# Patient Record
Sex: FEMALE | Race: WHITE | Employment: FULL TIME | URBAN - METROPOLITAN AREA
[De-identification: names, ages, dates, MRNs, and addresses within clinical notes are randomized per-mention and may not be internally consistent; named-entity substitution may affect disease eponyms.]

---

## 2017-09-21 PROBLEM — Z83.79 FH: CELIAC DISEASE: Status: ACTIVE | Noted: 2017-09-21

## 2017-09-25 PROBLEM — K90.0 CELIAC DISEASE: Status: ACTIVE | Noted: 2017-09-25

## 2017-10-10 ENCOUNTER — HOSPITAL ENCOUNTER (OUTPATIENT)
Dept: LAB | Age: 32
Discharge: HOME | End: 2017-10-10

## 2017-10-10 PROCEDURE — 88305 TISSUE EXAM BY PATHOLOGIST: CPT | Performed by: INTERNAL MEDICINE

## 2018-04-24 PROBLEM — E55.9 VITAMIN D DEFICIENCY: Status: ACTIVE | Noted: 2018-04-24

## 2018-04-24 PROBLEM — E53.8 B12 DEFICIENCY: Status: ACTIVE | Noted: 2018-04-24

## 2018-05-22 ENCOUNTER — HOSPITAL ENCOUNTER (OUTPATIENT)
Dept: MAMMOGRAPHY | Age: 33
Discharge: HOME | End: 2018-05-22
Attending: FAMILY MEDICINE
Payer: COMMERCIAL

## 2018-05-22 DIAGNOSIS — Z80.3 FAMILY HISTORY OF BREAST CANCER IN FIRST DEGREE RELATIVE: ICD-10-CM

## 2018-05-22 PROCEDURE — 77067 SCR MAMMO BI INCL CAD: CPT

## 2018-09-17 ENCOUNTER — HOSPITAL ENCOUNTER (OUTPATIENT)
Dept: NUTRITION | Age: 33
Discharge: HOME | End: 2018-09-17
Payer: SELF-PAY

## 2018-09-17 PROCEDURE — 97802 MEDICAL NUTRITION INDIV IN: CPT

## 2018-09-20 NOTE — PROGRESS NOTES
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 .  Left food recall blank but describes her diet as \"eating out a lot. \" Is presently following a gluten free diet and has a good eduction for guidelines. Has attended a Celiac Disease Foundation symposium and also has attended pediatric gluten free classes for her son. For dining out pt reports looking at menus ahead of time for selections and also questioning carefully the establishment staff, cooks as needed to ensure that cross contamination is prevented. Brings what she terms \"rescue food\" in the event that the restaurant does not seem to be able to prevent cross contamination. Pt states that she has ceased to drink alcohol in order to avoid gluten (did enjoy beer). States she uses [employer], but does note occassions where she may a drink with employees while dining as a social connection. · Pt recalls that she was a \"silent Celiac\" until her son was hospitalized and then diagnosed with Celiac- following that family members were advised to have testing done. States her original biopsy was performed in October 2017. Reports 6 months later after following a gluten free diet a f/u assessment indicated that her gut mucosa was still not normal.  Of note, pt began to discuss history of having eating disorder counseling in college. Reports high stress can lead to emotional eating. Also notes that she has a history of restrcitng foods for weight and thought at first \"this is no big deal, but now realizing this is for life. \"      · Pt is very active with \"boot camp\" classes @ 4-5 days/week and rides horses as well. States she uses [employer] with a given 1200 kcal level. Reports 6 hours of sleep/night and would like to get 8hr/night.     · Pt desires further education regarding doubling checking if she is missing any consideration for eliminating gluten from her diet. Current Diagnosis:  Celiac disease     Past Medical History (all per pt):   ED counseling reported in college years  Anorexia  Binge eating disorder  anxiety    Current Medications:   VSL #3    Supplements/Vitamins/Herbs:  Vitamin D  Vitamin B12  E[place]    Socioeconomic Status/Current Lifestyle:   Pt is , with 2 children, and shares a business with her . Anthropometrics:  Ht: 5'4(1.626m)  Wt: 149# (67.7 kg)  IBW: 120# +/- 10%   BMI: 25.6 (Overweight BMI Class)   Recent changes in wt: Pt reports small amount of weight loss following gluten free changes, but reports wt fluctuates. Wt history in EMR:  WT / BMI WEIGHT BODY MASS INDEX   8/16/2018 152 lb 25.69 kg/m2   4/6/2018 148 lb 9.6 oz 25.11 kg/m2   2/12/2018 148 lb 12.8 oz 25.15 kg/m2   12/13/2017 150 lb 25.35 kg/m2   9/21/2017 157 lb 26.53 kg/m2   9/22/2016 148 lb 6.4 oz 25.08 kg/m2     Macronutrient needs:  EER: 1907 kcal/day (MSJ x PAL factor 1.4 to maintain current wt) +/- 10%  EPR: 60-72 gm pro/day (based on 1-1.2 gm/kg IBW 0f 60kg)    Est CHO Requirement:  238 gm/day (50% of kcals)  Est Fluid/day- 1.9 L liters/day (1 ml/kcal)                                                                            Nutrition Diagnosis: Food and nutrition knowledge deficit r/t lack of exposure to nutrition related information as evidenced new diagnosis of celiac disease and pt's request to confirm information regarding gluten-free diet guidelines. Intervention:      1. Nutrition Education:   · Provided celiac diet plan and discussed appropriate food choices and food choices to avoid. Recommended increasing kcal level considering pt's activity level. Recommended focuins on portions of food chocies verses counting kcals, eating on a regular schedule (no skipped meals) and having nutrition to support physical activity.   · Educated pt on hidden sources of gluten in medications, MVIs, and processed foods such as salad dressings and marinades, snack foods, beverages, and cross-contaminated oats. · Made recommendations for dining out, such as researching menus beforehand for gluten-free options, asking  about gluten-free options (and informing them pt is gluten-free), and using gluten-free apps to look up restaurants that offer gluten-free options. Encouraged to to question staff regarding separate preparation and severing equipment, separate prep area, and necessity to wash hands, change gloves, etc.   · Provided handout out gluten-free lifestyle apps. · Pt was attentive throughout session and asked appropriate questions. Pt is highly motivated to adhere to celiac diet. Expect good attempts will be made to follow diet recommendations. Unsure if barriers may exist with any unresolved emotional eating/ ED/ binge eating history. Gave resources for practitioners to assist with any residual signs of E.D behaviors. Encouraged pt to call with questions or if I can assist with referral.    Materials Provided:   1. Celiac Diet Plan  2. Tips for Avoiding Cross Contamination with Celiac Disease  3. Celiac Disease Foundation Tips for Carlos Morton  4. Celiac Disease Foundation Gluten in Medications, Vitamins, and Supplements  5. Celiac Disease Foundation Gluten Free Resources  6. Today's Dietitian Apps for Gluten Free Eating  7. Tax Deductions for Celiac Disease  8. Intuitive Eating Principles  9. Tips for Normal Eating  10. Information: Tracy Sexton MA, RD, LPC, LD   11. Email: Sports Nutrition and Sleep handouts    Monitoring/Evaluation Details: Follow-up Appointment: n/a    Follow-up Monitoring Plans: Will maggy to check on progress and any f/u needs in 3-4 weeks.      Mara Llanos MS, [address], [address] [place] [address] Rd, [place]  W: [phone]  C: [phone]

## 2019-06-10 ENCOUNTER — DOCUMENTATION ONLY (OUTPATIENT)
Dept: NUTRITION | Age: 34
End: 2019-06-10

## 2019-06-10 NOTE — PROGRESS NOTES
Nutrition Counseling:  Pt referred by Dr. Angel Reyes. Pt has not called with questions or to schedule a  f/u appt. Will close referral on this end.     Abhishek Muro MS, [address] [place] [address], [id number], [place]  W: [phone]  C: [phone]

## 2021-02-17 ENCOUNTER — HOSPITAL ENCOUNTER (OUTPATIENT)
Dept: LAB | Age: 36
Discharge: HOME | End: 2021-02-17

## 2021-02-17 PROCEDURE — 88305 TISSUE EXAM BY PATHOLOGIST: CPT

## 2021-02-17 PROCEDURE — 88312 SPECIAL STAINS GROUP 1: CPT

## 2021-02-22 ENCOUNTER — HOSPITAL ENCOUNTER (OUTPATIENT)
Dept: INFUSION THERAPY | Age: 36
Discharge: HOME | End: 2021-02-22
Payer: COMMERCIAL

## 2021-02-22 VITALS
TEMPERATURE: 96.9 F | DIASTOLIC BLOOD PRESSURE: 65 MMHG | SYSTOLIC BLOOD PRESSURE: 111 MMHG | HEART RATE: 75 BPM | RESPIRATION RATE: 18 BRPM | OXYGEN SATURATION: 100 %

## 2021-02-22 PROCEDURE — 74011250636 HC RX REV CODE- 250/636: Performed by: INTERNAL MEDICINE

## 2021-02-22 PROCEDURE — 96361 HYDRATE IV INFUSION ADD-ON: CPT

## 2021-02-22 PROCEDURE — 96365 THER/PROPH/DIAG IV INF INIT: CPT

## 2021-02-22 RX ORDER — EPINEPHRINE 1 MG/ML
0.3 INJECTION, SOLUTION, CONCENTRATE INTRAVENOUS AS NEEDED
Status: ACTIVE | OUTPATIENT
Start: 2021-02-22 | End: 2021-02-22

## 2021-02-22 RX ORDER — ACETAMINOPHEN 325 MG/1
650 TABLET ORAL AS NEEDED
Status: ACTIVE | OUTPATIENT
Start: 2021-02-22 | End: 2021-02-22

## 2021-02-22 RX ORDER — HYDROCORTISONE SODIUM SUCCINATE 100 MG/2ML
100 INJECTION, POWDER, FOR SOLUTION INTRAMUSCULAR; INTRAVENOUS AS NEEDED
Status: ACTIVE | OUTPATIENT
Start: 2021-02-22 | End: 2021-02-22

## 2021-02-22 RX ORDER — DIPHENHYDRAMINE HYDROCHLORIDE 50 MG/ML
50 INJECTION, SOLUTION INTRAMUSCULAR; INTRAVENOUS AS NEEDED
Status: DISPENSED | OUTPATIENT
Start: 2021-02-22 | End: 2021-02-22

## 2021-02-22 RX ORDER — ALBUTEROL SULFATE 0.83 MG/ML
2.5 SOLUTION RESPIRATORY (INHALATION) AS NEEDED
Status: ACTIVE | OUTPATIENT
Start: 2021-02-22 | End: 2021-02-22

## 2021-02-22 RX ORDER — ONDANSETRON 2 MG/ML
8 INJECTION INTRAMUSCULAR; INTRAVENOUS AS NEEDED
Status: ACTIVE | OUTPATIENT
Start: 2021-02-22 | End: 2021-02-22

## 2021-02-22 RX ORDER — MELATONIN
DAILY
COMMUNITY

## 2021-02-22 RX ADMIN — FERRIC CARBOXYMALTOSE INJECTION 750 MG: 50 INJECTION, SOLUTION INTRAVENOUS at 09:21

## 2021-02-22 RX ADMIN — SODIUM CHLORIDE 500 ML: 900 INJECTION, SOLUTION INTRAVENOUS at 09:00

## 2021-02-22 NOTE — PROGRESS NOTES
I spoke with Mrs. Temple regarding the [place] [facility] co-pay program.  I went over both programs with her, the enrollment process, and what the co-pay program helps with. She agreed to allow me to do the enrollment for her and signed the LPOA document. I went over the hospital financial assistance application with Mrs. Temple as well. She inidcated that she is over the income limit for the program.    Patient expressed understanding of the above information and all questions were answered to her satisfaction. LPOA will be scanned into chart.

## 2021-02-22 NOTE — PROGRESS NOTES
Tolerated Injectafer infusion over 20 minutes. Observed x  30 minutes without reaction. Directed to push oral fluids for next 24-48 hours. Reviewed side effects of medication such as constipation and black stools. Verbalizes understanding. Teaching handout with patient. Patient discharged via ambulation accompanied by self. Instructed to notify physician of any problems, questions or concerns after discharge. Next appointment is 03/01/2021 at 0830 with Infusion.

## 2021-03-01 ENCOUNTER — HOSPITAL ENCOUNTER (OUTPATIENT)
Dept: INFUSION THERAPY | Age: 36
Discharge: HOME | End: 2021-03-01
Payer: COMMERCIAL

## 2021-03-01 VITALS
TEMPERATURE: 97.1 F | OXYGEN SATURATION: 99 % | HEART RATE: 55 BPM | RESPIRATION RATE: 18 BRPM | SYSTOLIC BLOOD PRESSURE: 105 MMHG | DIASTOLIC BLOOD PRESSURE: 68 MMHG

## 2021-03-01 PROCEDURE — 74011250636 HC RX REV CODE- 250/636: Performed by: INTERNAL MEDICINE

## 2021-03-01 PROCEDURE — 96365 THER/PROPH/DIAG IV INF INIT: CPT

## 2021-03-01 PROCEDURE — 74011250636 HC RX REV CODE- 250/636: Performed by: FAMILY MEDICINE

## 2021-03-01 RX ORDER — SODIUM CHLORIDE 0.9 % (FLUSH) 0.9 %
5-10 SYRINGE (ML) INJECTION EVERY 8 HOURS
Status: DISCONTINUED | OUTPATIENT
Start: 2021-03-01 | End: 2021-03-03 | Stop reason: HOSPADM

## 2021-03-01 RX ADMIN — Medication 10 ML: at 09:00

## 2021-03-01 RX ADMIN — SODIUM CHLORIDE 500 ML: 900 INJECTION, SOLUTION INTRAVENOUS at 09:00

## 2021-03-01 RX ADMIN — FERRIC CARBOXYMALTOSE INJECTION 750 MG: 50 INJECTION, SOLUTION INTRAVENOUS at 09:15

## 2021-03-01 NOTE — PROGRESS NOTES
Pt. Discharged ambulatory. Tolerated infusion well. No distress noted. To call physician with any problems or concerns. Understanding voiced. To follow up with ordering physician. No future appointments needed.

## 2022-03-18 PROBLEM — K90.0 CELIAC DISEASE: Status: ACTIVE | Noted: 2017-09-25

## 2022-03-18 PROBLEM — E53.8 B12 DEFICIENCY: Status: ACTIVE | Noted: 2018-04-24

## 2022-03-18 PROBLEM — Z83.79 FH: CELIAC DISEASE: Status: ACTIVE | Noted: 2017-09-21

## 2022-03-19 PROBLEM — E55.9 VITAMIN D DEFICIENCY: Status: ACTIVE | Noted: 2018-04-24

## 2023-05-10 RX ORDER — CETIRIZINE HYDROCHLORIDE 10 MG/1
TABLET ORAL
COMMUNITY

## 2023-09-05 ENCOUNTER — TRANSCRIBE ORDERS (OUTPATIENT)
Dept: SCHEDULING | Age: 38
End: 2023-09-05

## 2023-09-05 DIAGNOSIS — R10.32 ABDOMINAL PAIN, LLQ: ICD-10-CM

## 2023-09-05 DIAGNOSIS — K90.0 CELIAC DISEASE: ICD-10-CM

## 2023-09-05 DIAGNOSIS — N92.0 MENORRHAGIA WITH REGULAR CYCLE: ICD-10-CM

## 2023-09-12 ENCOUNTER — HOSPITAL ENCOUNTER (OUTPATIENT)
Dept: CT IMAGING | Age: 38
Discharge: HOME | End: 2023-09-15
Attending: INTERNAL MEDICINE
Payer: COMMERCIAL

## 2023-09-12 DIAGNOSIS — R10.32 ABDOMINAL PAIN, LLQ: ICD-10-CM

## 2023-09-12 DIAGNOSIS — N92.0 MENORRHAGIA WITH REGULAR CYCLE: ICD-10-CM

## 2023-09-12 DIAGNOSIS — K90.0 CELIAC DISEASE: ICD-10-CM

## 2023-09-12 PROCEDURE — 74177 CT ABD & PELVIS W/CONTRAST: CPT

## 2023-09-12 PROCEDURE — 6360000004 HC RX CONTRAST MEDICATION: Performed by: INTERNAL MEDICINE

## 2023-09-12 RX ADMIN — IOPAMIDOL 100 ML: 755 INJECTION, SOLUTION INTRAVENOUS at 14:12

## 2023-09-12 RX ADMIN — DIATRIZOATE MEGLUMINE AND DIATRIZOATE SODIUM 15 ML: 660; 100 LIQUID ORAL; RECTAL at 14:12
